# Patient Record
(demographics unavailable — no encounter records)

---

## 2017-04-26 NOTE — ER DOCUMENT REPORT
ED Respiratory Problem





- General


Chief Complaint: Cold Symptoms


Stated Complaint: CONGESTION


Information source: Patient


Notes: 


59-year-old male who presents today with 5-6 days of nasal congestion and a 

nonproductive cough without fevers, vomiting, chest pain, back pain, leg 

swelling, or diarrhea.  Patient states his coworker had similar symptoms last 

week.  Patient denies any difficulty breathing or swallowing.


TRAVEL OUTSIDE OF THE [place] IN LAST 30 DAYS: No





- HPI


Patient complains to provider of: Cough


Onset: Other - See above


Duration: Continuous


Quality of pain: No pain


Severity: Mild


Pain Level: Denies


Short of Breath: Mild


Cough: Nonproductive


Sputum amount: Scant


Sputum color: Clear


Associated symptoms: Other - See above


Similar symptoms previously: No


Recently seen / treated by doctor: No





- Related Data


Allergies/Adverse Reactions: 


 





No Known Allergies Allergy (Verified 03/20/16 10:41)


 











Past Medical History





- General


Information source: Patient





- Social History


Smoking Status: Unknown if Ever Smoked


Cigarette use (# per day): No


Chew tobacco use (# tins/day): No


Smoking Education Provided: No


Frequency of alcohol use: None


Drug Abuse: None


Family History: CAD





- Past Medical History


Cardiac Medical History: Reports: Hx Hypertension


   Denies: Hx Heart Attack


Pulmonary Medical History: 


   Denies: Hx Asthma


Neurological Medical History: Denies: Hx Cerebrovascular Accident, Hx Seizures


Renal/ Medical History: Denies: Hx Peritoneal Dialysis


GI Medical History: Denies: Hx Hepatitis, Hx Hiatal Hernia, Hx Ulcer


Infectious Medical History: Denies: Hx Hepatitis


Past Surgical History: Denies: Hx Open Heart Surgery, Hx Pacemaker





- Immunizations


Immunizations up to date: Yes


Hx Diphtheria, Pertussis, Tetanus Vaccination: Yes





Review of Systems





- Review of Systems


Constitutional: denies: Fever


EENT: Nose congestion, Nose discharge.  denies: Eye discharge


Cardiovascular: denies: Chest pain, Palpitations


Respiratory: denies: Short of breath


Gastrointestinal: denies: Vomiting


Genitourinary: denies: Dysuria


Musculoskeletal: denies: Leg swelling


Skin: Other - no hives.  denies: Rash


Neurological/Psychological: Other - no slurred speech


-: Yes All other systems reviewed and negative





Physical Exam





- Vital signs


Vitals: 





 











Temp Pulse Resp BP Pulse Ox


 


 98.4 F   92   18   141/82 H  98 


 


 04/26/17 18:19  04/26/17 18:19  04/26/17 18:19  04/26/17 18:19  04/26/17 18:19











Notes: 


Reviewed vital signs and nursing note as charted by RN.





CONSTITUTIONAL: Alert and oriented and responds appropriately to questions. Well

-appearing; well-nourished





HEAD: Normocephalic; atraumatic





EYES: PERRL; Conjunctivae clear, sclerae non-icteric





ENT: Normal nose; no rhinorrhea; moist mucous membranes; pharynx without 

lesions noted





NECK: no cervical lymphadenopathy, no masses





CARD: Regular rate and rhythm; no murmurs, no clicks, no rubs, no gallops; 

symmetric distal pulses





RESP: Normal chest excursion without splinting or tachypnea; breath sounds 

clear and equal bilaterally; no wheezing or rhonchi present.





BACK: The back appears normal and is non-tender to palpation





EXT: Normal ROM in all joints; non-tender to palpation; no cyanosis, no 

effusions, no edema





SKIN: Normal color for age and race; warm; dry; good turgor; capillary refill < 

2 seconds; no acute lesions noted





NEURO: Moves all extremities equally; Motor and sensory function intact





PSYCH: The patient's mood and manner are appropriate. Grooming and personal 

hygiene are appropriate.





Course





- Re-evaluation


Re-evalutation: 





04/26/17 19:14


Given the history and physical examination, with good oxygen saturation, clear 

lungs bilaterally, bilateral nasal rhinorrhea, I do not believe that the 

patient requires an x-ray at this time.  I believe the pretest probability for 

pneumonia to be extremely low.  Patient will be discharged home with strict 

return precautions and cough syrup.





- Vital Signs


Vital signs: 





 











Temp Pulse Resp BP Pulse Ox


 


 98.4 F   92   18   141/82 H  98 


 


 04/26/17 18:19  04/26/17 18:19  04/26/17 18:19  04/26/17 18:19  04/26/17 18:19














Discharge





- Discharge


Clinical Impression: 


 Nasal congestion, Cough





Condition: Good


Disposition: HOME, SELF-CARE


Additional Instructions: 


Come back immediately for any worsening cough, leg swelling, fever, difficulty 

breathing, chest pain, or any other acute problems.


Prescriptions: 


Guaifenesin/Codeine Phosphate [Codeine-Guaifen  mg/5 ml] 10 ml PO QID PRN 

#1 liquid


 PRN Reason:

## 2018-04-29 NOTE — RADIOLOGY REPORT (SQ)
EXAM DESCRIPTION:  L SPINE WHOLE



COMPLETED DATE/TIME:  4/29/2018 5:48 pm



REASON FOR STUDY:  low back pain



COMPARISON:  None.



NUMBER OF VIEWS:  Five views including obliques.



TECHNIQUE:  AP, lateral, oblique, and sacral radiographic images acquired of the lumbar spine.



LIMITATIONS:  None.



FINDINGS:  MINERALIZATION: Normal.

SEGMENTATION: Normal.  No transitional anatomy.

ALIGNMENT: Normal.

VERTEBRAE: Maintained height.  No fracture or worrisome bone lesion.

DISCS: Multilevel disc space narrowing with osteophytes.

POSTERIOR ELEMENTS: Pedicles and facets are intact.  No pars defect or posterior arch defects. Facet 
arthropathy is present.

HARDWARE: None in the spine.

PARASPINAL SOFT TISSUES: Normal.

PELVIS: Intact as visualized. No fractures or worrisome bone lesions. SI joints intact.

OTHER: No other significant finding.



IMPRESSION:  SPONDYLOSIS WITHOUT BONE LESION OR FRACTURE.



TECHNICAL DOCUMENTATION:  JOB ID:  [id number]

[id number]

 2011 [employer]- All Rights Reserved



Reading location - IP/workstation name: [email or web address]

## 2018-04-29 NOTE — ER DOCUMENT REPORT
HPI





- HPI


Patient complains to provider of: Low back pain


Onset: This afternoon


Onset/Duration: Gradual


Quality of pain: Achy


Pain Level: 4


Context: 





Patient complains of low back pain today that started while he was at [employer].  

Patient denies any injury.  Patient denies any urinary retention or 

incontinence.  Patient denies any fever.  Patient states he has had back pain 

in the past in this location although not typically severe.


Associated Symptoms: Other - Low back pain.  denies: Fever, Headache


Exacerbated by: Movement, Walking


Relieved by: Denies


Similar symptoms previously: Yes


Recently seen / treated by doctor: No





- ROS


ROS below otherwise negative: Yes


Systems Reviewed and Negative: Yes All other systems reviewed and negative





- CONSTITUTIONAL


Constitutional: DENIES: Fever, Chills





- NEURO


Neurology: DENIES: Weakness





- CARDIOVASCULAR


Cardiovascular: DENIES: Chest pain





- RESPIRATORY


Respiratory: DENIES: Trouble Breathing, Coughing





- URINARY


Notes: 





No retention or incontinence





- REPRODUCTIVE


Reproductive: DENIES: Pregnant:





- MUSCULOSKELETAL


Musculoskeletal: REPORTS: Back Pain





- DERM


Skin Color: Normal


Skin Problems: None





Past Medical History





- General


Information source: Patient





- Social History


Smoking Status: Never Smoker


Frequency of alcohol use: None


Drug Abuse: None


Occupation: Painting


Lives with: Spouse/Significant other


Family History: CAD


Patient has suicidal ideation: No


Patient has homicidal ideation: No





- Past Medical History


Cardiac Medical History: Reports: Hx Hypertension


   Denies: Hx Heart Attack


Pulmonary Medical History: 


   Denies: Hx Asthma


Neurological Medical History: Denies: Hx Cerebrovascular Accident, Hx Seizures


Renal/ Medical History: Denies: Hx Peritoneal Dialysis


GI Medical History: Denies: Hx Hepatitis, Hx Hiatal Hernia, Hx Ulcer


Infectious Medical History: Denies: Hx Hepatitis


Surgical Hx: Negative





- Immunizations


Immunizations up to date: Yes


Hx Diphtheria, Pertussis, Tetanus Vaccination: Yes





Vertical Provider Document





- CONSTITUTIONAL


Agree With Documented VS: Yes


Exam Limitations: No Limitations


General Appearance: WD/WN, No Apparent Distress


Notes: 





PHYSICAL EXAMINATION:





GENERAL: Well-appearing, well-nourished and in no acute distress.





HEAD: Atraumatic, normocephalic.





EYES: sclera clear, anicteric, conjunctiva are normal.





ENT: nares patent, Moist mucous membranes.





NECK: Normal range of motion, supple no lymphadenopathy





LUNGS: respirations unlabored





HEART: Regular rate and rhythm without murmurs 





EXTREMITIES: Normal range of motion, no pitting or edema.  No cyanosis. Gait 

normal, pt ambulates without difficulty





BACK: Left lower lumbar paraspinal tenderness, no midline tenderness, no 

deformities or step-offs.  No CVA tenderness. 





NEUROLOGICAL: Cranial nerves grossly intact.  Normal speech, normal gait.  No 

saddle anesthesia. 





PSYCH: Normal mood, normal affect.





SKIN: Warm, Dry, normal turgor, no rashes or lesions noted.











- INFECTION CONTROL


TRAVEL OUTSIDE OF THE [place] IN LAST 30 DAYS: No





Course





- Re-evaluation


Re-evalutation: 





04/29/18 19:06


Patient reports pain is improved after medication.  Reviewed patient's x-ray 

report.  The patient presents with low back pain without signs of spinal cord 

compression, cauda equina syndrome, infection, aneurysm, or other serious 

etiology.  The patient is neurologically intact.  Given the extremely risk of 

these diagnoses further testing and evaluation for these possibilities does not 

appear to be indicated at this time.  Patient has been instructed to return if 

the symptoms worsen or change in any way.


04/29/18 19:07


Discussed emergent flag symptoms that patient should return immediately for.  

Patient and wife verbalized understanding and agree with plan of care.





- Vital Signs


Vital signs: 


 











Temp Pulse Resp BP Pulse Ox


 


 98.7 F   86   14   113/65   97 


 


 04/29/18 15:59  04/29/18 15:59  04/29/18 15:59  04/29/18 15:59  04/29/18 15:59














- Diagnostic Test


Radiology reviewed: Reports reviewed





Discharge





- Discharge


Clinical Impression: 


Low back pain


Qualifiers:


 Chronicity: acute Back pain laterality: left Sciatica presence: with sciatica 

Sciatica laterality: sciatica of left side Qualified Code(s): M54.42 - Lumbago 

with sciatica, left side





Condition: Stable


Disposition: HOME, SELF-CARE


Instructions:  Ice Packs ([facility]), Low Back Pain ([facility]), Oral Narcotic Medication (

[facility]), Sciatica ([facility])


Additional Instructions: 


Return immediately for any new or worsening symptoms





Followup with your primary care provider, call tomorrow to make a followup 

appointment








Prescriptions: 


Ondansetron HCl [Zofran 4 mg Tablet] 1 - 2 tab PO Q6 PRN #15 tablet


 PRN Reason: 


Oxycodone HCl/Acetaminophen [Percocet 5-325 mg Tablet] 1 tab PO ASDIR PRN #15 

tablet


 PRN Reason: 


Forms:  Return to Work


Referrals: 


[facility] [Provider Group] - Follow up as needed


[facility] [Provider Group] - Follow up tomorrow

## 2019-02-25 NOTE — ER DOCUMENT REPORT
ED ENT





- General


Chief Complaint: Painful Cough


Stated Complaint: FLU LIKE SYMPTOMS


Time Seen by Provider: 02/25/19 18:34


Mode of Arrival: Ambulatory


Information source: Patient


Notes: 





60-year-old male presented to ED for complaint of cough cold congestion with dry

cough times 2 weeks.


TRAVEL OUTSIDE OF THE [place] IN LAST 30 DAYS: No





- HPI


Patient complains to provider of: Dental problem, Ear problem, Nose problem, 

Throat problem


Onset: Other - Dental pain on Saturday fever cough cold congestion times 2 weeks


Onset/Duration: Gradual


Quality of pain: Achy, Stabbing


Severity: Moderate


Pain Level: 2


Context: Recent Illness, Other - Dental pain


Location of pain: Nose, Sinus, Tooth


Associated symptoms: Congestion, Cough, Dental pain, Dental caries, Runny nose, 

Sinus pain, Sinus drainage, Sore throat


Similar symptoms previously: Yes


Recently seen / treated by doctor: No





- Related Data


Allergies/Adverse Reactions: 


                                        





No Known Allergies Allergy (Verified 03/20/16 10:41)


   











Past Medical History





- General


Information source: Patient





- Social History


Smoking Status: Current Every Day Smoker


Cigarette use (# per day): Yes - 6 cigarettes a day


Smoking Education Provided: Yes - Minutes


Frequency of alcohol use: None


Drug Abuse: None


Occupation: 


Lives with: Family


Family History: CAD


Patient has suicidal ideation: No


Patient has homicidal ideation: No





- Past Medical History


Cardiac Medical History: Reports: Hx Hypertension


Pulmonary Medical History: Reports: None


EENT Medical History: Reports: None


Neurological Medical History: Reports: None


Endocrine Medical History: Reports: None


Renal/ Medical History: Reports: None


Malignancy Medical History: Reports None


GI Medical History: Reports: None


Musculoskeletal Medical History: Reports None


Skin Medical History: Reports None


Psychiatric Medical History: Reports: None


Traumatic Medical History: Reports: None


Infectious Medical History: Reports: None


Surgical Hx: Negative


Past Surgical History: Reports: None





- Immunizations


Immunizations up to date: Yes


Hx Diphtheria, Pertussis, Tetanus Vaccination: Yes





Review of Systems





- Review of Systems


Constitutional: No symptoms reported


EENT: Nose discharge, Sinus pressure, Sinus discharge, Mouth pain, Dental 

problem


Cardiovascular: No symptoms reported


Respiratory: Cough


Gastrointestinal: No symptoms reported


Genitourinary: No symptoms reported


Male Genitourinary: No symptoms reported


Musculoskeletal: No symptoms reported


Skin: No symptoms reported


Hematologic/Lymphatic: No symptoms reported


Neurological/Psychological: No symptoms reported


-: Yes All other systems reviewed and negative





Physical Exam





- Vital signs


Vitals: 


                                        











Temp Pulse Resp BP Pulse Ox


 


 99.7 F   99   16   144/85 H  96 


 


 02/25/19 17:14  02/25/19 17:14  02/25/19 17:14  02/25/19 17:14  02/25/19 17:14











Interpretation: Normal





- General


General appearance: Appears well, Alert





- HEENT


Head: Normocephalic, Atraumatic


Eyes: Normal


Pupils: PERRL


Ears: Normal


External canal: Normal


Tympanic membrane: Normal


Sinus: Normal


Nasal: Purulent discharge, Swelling


Mouth/Lips: Caries


Mucous membranes: Normal


Pharynx: Post nasal drainage


Neck: Normal





- Respiratory


Respiratory status: No respiratory distress


Chest status: Nontender


Breath sounds: Nonproductive cough


Chest palpation: Normal





- Cardiovascular


Rhythm: Regular


Heart sounds: Normal auscultation


Murmur: No





- Abdominal


Inspection: Normal


Distension: No distension


Bowel sounds: Normal


Tenderness: Nontender


Organomegaly: No organomegaly





- Back


Back: Normal, Nontender





- Extremities


General upper extremity: Normal inspection, Nontender, Normal color, Normal ROM,

Normal temperature


General lower extremity: Normal inspection, Nontender, Normal color, Normal ROM,

Normal temperature, Normal weight bearing.  No: Jose Alberto's sign





- Neurological


Neuro grossly intact: Yes


Cognition: Normal


Orientation: AAOx4


[place] Coma Scale Eye Opening: Spontaneous


[place] Coma Scale Verbal: Oriented


[place] Coma Scale Motor: Obeys Commands


[place] Coma Scale Total: 15


Speech: Normal


Motor strength normal: LUE, RUE, LLE, RLE


Sensory: Normal





- Psychological


Associated symptoms: Normal affect, Normal mood





- Skin


Skin Temperature: Warm


Skin Moisture: Dry


Skin Color: Normal





Course





- Vital Signs


Vital signs: 


                                        











Temp Pulse Resp BP Pulse Ox


 


 99.1 F   93   16   147/82 H  95 


 


 02/25/19 19:38  02/25/19 19:38  02/25/19 19:38  02/25/19 19:38  02/25/19 19:38














Discharge





- Discharge


Clinical Impression: 


 Pain due to dental caries





URI (upper respiratory infection)


Qualifiers:


 URI type: unspecified viral URI Qualified Code(s): J06.9 - Acute upper 

respiratory infection, unspecified





Condition: Stable


Disposition: HOME, SELF-CARE


Instructions:  Family Physicians / Practices


Additional Instructions: 


TOOTHACHE:





     Your pain is due to dental decay.  The tooth must be repaired in order for 

you to feel better.  You will, therefore, be referred to a dentist.  We do not 

have dentists on the staff at [facility].


     Severe swelling or drainage around a tooth usually means a dental abscess. 

This also requires evaluation and treatment by the dentist, but antibiotics may 

be prescribed while awaiting dental treatment.


     You should be rechecked immediately if you develop major swelling of the 

face, increasing pain, a lump in the jaw or gums, headache, difficulty s

wallowing, or fever.





UPPER RESPIRATORY ILLNESS:





     You have a viral infection of the respiratory passages -- a "cold."  This 

common infection causes nasal congestion, drainage, and often sore throat and 

cough.  It is highly contagious.  The disease usually lasts about 10 to 14 days.


     There is no "cure" for the viral infection -- it must run its course.  If 

there is a complication, such as bacterial infection in the nose, sinuses, 

middle ear, or bronchial tubes, antibiotics may be required.  The antibiotics 

won't affect the virus.


     Drink plenty of fluids.  A humidifier may help.  An expectorant medication 

or decongestant may make you more comfortable.  Use acetaminophen or ibuprofen 

for fever or aches.


     See the doctor if fever persists over two days, if there is any significant

worsening of your symptoms, or if you simply fail to improve as expected.





ORAL NARCOTIC MEDICATION:


     You have been given a prescription for pain control.  This medication is a 

narcotic.  It's best taken with food, as nausea can result if taken on an empty 

stomach.


     Don't operate machinery or drive within six hours of taking this 

medication.  Do not combine this medicine with alcohol, or with any medication 

which can cause sedation (such as cold tablets or sleeping pills) unless you get

permission from the physician.


     Narcotics tend to cause constipation.  If possible, drink plenty of fluids 

and eat a diet high in fiber and fruits.





     Please be aware that prescription narcotics also have the potential for 

abuse.  People become addicted to these medications because of the general sense

of wellbeing that they induce.  This feeling along with a significant reduction 

in tension, anxiety, and aggression provides a stimulating seductive quality to 

these drugs.  Once your pain is under control, we encourage you to discard your 

unused narcotics.





Amoxicillin 





    Amoxicillin is a member of the penicillin family.  It covers the germs li

artem to cause ear, bronchial, and urinary infections better than plain 

penicillin.


     Amoxicillin can be taken without regard to meals.  Nausea after taking the 

medication is rare, but can occur.  Diarrhea can occur, particularly in small 

children.  Vaginal yeast infections and oral thrush in infants are also common. 

Contact your physician if these problems occur.


     Allergy to penicillins is common.  If you have had an allergic reaction to 

any drug of the penicillin family, you should never take any other penicillin.  

Notify your doctor at once if you develop hives, itching, swelling, faintness, 

or shortness of breath.  Less serious side effects can include nausea or 

diarrhea.





SMOKING:


     If you smoke, you should stop smoking.  The tar and chemicals in cigarette 

smoke are harmful.  Smoking has been shown to cause:


          emphysema


          chronic bronchitis


          lung cancer


          mouth and throat cancer


          stomach and pancreas cancer


          premature aging


          birth defects


     In addition, smoking increases ear and lung infections in children of 

smokers.








FOLLOW-UP CARE:


     You have been referred for follow-up care to the dentists listed below.  

Call the dentists office for an appointment as you were instructed or within 

the next two days.  If you experience worsening or a significant change in your 

symptoms, notify the physician immediately or return to the Emergency Department

at any time for re-evaluation.





[facility]


[address] [place], [place]


[phone]








[facility]


[address] [place] [address] [phone]





[facility]


[address]


[phone]





[facility]


[address]


[facility]


[phone]





[facility]


[address]


[phone]


[email or web address]





[facility]


[address] [place]  [address] [phone]


Monday-Thursdays  8:00am to 5:00 pm


Will see patients from other [facility].


Charges based on income and family size and


accepts Medicare, Medicaid, and Insurances


Will pull molars





[facility]


Student Clinics


[facility] [address] [phone]


Hours of Operation


   8:00 am - 4:30 pm weekdays





The following dental offices accept Medicaid:





   [facility]   [phone]


   Dr. Denson         [phone]


   Dr. Bhatia         [phone]


   Dr. Berrios         [phone]


   Dr. Watkins         [phone]


   [facility]


      oral surgery      [phone]


   Dr. Manriquez ([place])      [phone]


   Dr. Rodriguez ([place])   [phone]


   [facility]      [phone]


   [facility] ([place])   [phone]


   Dr. Wyatt ([place])      [phone]


   [facility]      [phone]


   [facility]   [phone]


   [facility]   [phone]


   Dr. Izaguirre ([place])      [phone]


   [facility] Peralta and 


      ([place])      [phone]





   Medicaid Care Line      [phone]


Prescriptions: 


Hydrocodone/Acetaminophen [Norco 5-325 mg Tablet] 1 tab PO Q8HP PRN #10 tablet


 PRN Reason: 


Amoxicillin Trihydrate [Amoxil 875 mg Tablet] 1 tab PO BID #20 tablet


Forms:  Elevated Blood Pressure, Smoking Cessation Education, Return to Work

## 2019-03-19 NOTE — ER DOCUMENT REPORT
ED Respiratory Problem





- General


Chief Complaint: Cough


Stated Complaint: COUGH


Time Seen by Provider: 03/19/19 18:59


Primary Care Provider: 


[facility] [Provider Group] - Follow up as needed


Mode of Arrival: Ambulatory


Information source: Patient


Notes: 





60-year-old male presented to ED for complaint of cough times a month.  He was 

diagnosed with an upper respiratory infection and the earache a month ago.  He 

states the cough is still not gone.  Patient is alert oriented respirations 

regular and unlabored speaking in full sentences walks with a even steady gait. 

Patient does still have a nonproductive cough.  He does continue to smoke 

cigarettes.


TRAVEL OUTSIDE OF THE [place] IN LAST 30 DAYS: No





- HPI


Patient complains to provider of: Cough


Onset: Other - A month


Duration: Intermittent episodes


Initiating Event: URI


Quality of pain: Achy


Severity: Moderate


Pain Level: 3


Context: Smoker


Cough: Nonproductive


Sputum amount: None


Associated symptoms: Congestion, Cough, PND, Runny nose, Sinus pain/pressure


Similar symptoms previously: Yes


Recently seen / treated by doctor: No





- Related Data


Allergies/Adverse Reactions: 


                                        





No Known Allergies Allergy (Verified 03/19/19 18:07)


   











Past Medical History





- General


Information source: Patient





- Social History


Smoking Status: Current Every Day Smoker


Cigarette use (# per day): Yes - 6 cigarettes a day


Smoking Education Provided: Yes - Minutes


Frequency of alcohol use: None


Drug Abuse: None


Occupation: 


Lives with: Family


Family History: CAD


Patient has suicidal ideation: No


Patient has homicidal ideation: No





- Past Medical History


Cardiac Medical History: Reports: Hx Hypertension


Pulmonary Medical History: Reports: Hx Bronchitis


EENT Medical History: Reports: None


Neurological Medical History: Reports: None


Endocrine Medical History: Reports: None


Renal/ Medical History: Reports: None


Malignancy Medical History: Reports None


GI Medical History: Reports: None


Musculoskeletal Medical History: Reports None


Skin Medical History: Reports None


Psychiatric Medical History: Reports: None


Traumatic Medical History: Reports: None


Infectious Medical History: Reports: None.  Denies: Hx Hepatitis


Surgical Hx: Negative


Past Surgical History: Reports: None





- Immunizations


Immunizations up to date: Yes


Hx Diphtheria, Pertussis, Tetanus Vaccination: Yes





Review of Systems





- Review of Systems


Constitutional: No symptoms reported


EENT: No symptoms reported


Cardiovascular: No symptoms reported


Respiratory: No symptoms reported


Gastrointestinal: No symptoms reported


Genitourinary: No symptoms reported


Male Genitourinary: No symptoms reported


Musculoskeletal: No symptoms reported


Skin: No symptoms reported


Hematologic/Lymphatic: No symptoms reported


Neurological/Psychological: No symptoms reported





Physical Exam





- Vital signs


Vitals: 


                                        











Temp Pulse Resp BP Pulse Ox


 


 98.3 F   88   18   146/80 H  96 


 


 03/19/19 18:22  03/19/19 18:22  03/19/19 18:22  03/19/19 18:22  03/19/19 18:22











Interpretation: Normal





- General


General appearance: Appears well, Alert





- HEENT


Head: Normocephalic, Atraumatic


Eyes: Normal


Pupils: PERRL


Ears: Normal


External canal: Normal


Tympanic membrane: Normal


Sinus: Normal


Nasal: Purulent discharge, Swelling


Mouth/Lips: Normal


Mucous membranes: Normal


Pharynx: Erythema, Post nasal drainage.  No: Exudate, Tonsillar hypertrophy


Neck: Normal





- Respiratory


Respiratory status: No respiratory distress


Chest status: Nontender


Breath sounds: Nonproductive cough


Chest palpation: Normal





- Cardiovascular


Rhythm: Regular


Heart sounds: Normal auscultation


Murmur: No





- Abdominal


Inspection: Normal


Distension: No distension


Bowel sounds: Normal


Tenderness: Nontender


Organomegaly: No organomegaly





- Back


Back: Normal, Nontender





- Extremities


General upper extremity: Normal inspection, Nontender, Normal color, Normal ROM,

Normal temperature


General lower extremity: Normal inspection, Nontender, Normal color, Normal ROM,

Normal temperature, Normal weight bearing.  No: Jose Alberto's sign





- Neurological


Neuro grossly intact: Yes


Cognition: Normal


Orientation: AAOx4


[place] Coma Scale Eye Opening: Spontaneous


[place] Coma Scale Verbal: Oriented


[place] Coma Scale Motor: Obeys Commands


[place] Coma Scale Total: 15


Speech: Normal


Motor strength normal: LUE, RUE, LLE, RLE


Sensory: Normal





- Psychological


Associated symptoms: Normal affect, Normal mood





- Skin


Skin Temperature: Warm


Skin Moisture: Dry


Skin Color: Normal





Course





- Re-evaluation


Re-evalutation: 





03/19/19 21:12


After performing a Medical Screening Examination, I estimate there is LOW risk 

for ACUTE CORONARY SYNDROME, RESPIRATORY FAILURE, SEPSIS OR MENINGITIS, thus I 

consider the discharge disposition reasonable.  I have reevaluated this patient 

multiple times and no significant life threatening changes are noted. The 

patient and I have discussed the diagnosis and risks, and we agree with 

discharging home with close follow-up. We also discussed returning to the 

Emergency Department immediately if new or worsening symptoms occur. We have 

discussed the symptoms which are most concerning (e.g., changing or worsening 

pain, trouble swallowing or breathing, neck stiffness, fever) that necessitate 

immediate return.





- Vital Signs


Vital signs: 


                                        











Temp Pulse Resp BP Pulse Ox


 


 98.3 F   88   18   146/80 H  96 


 


 03/19/19 18:22  03/19/19 18:22  03/19/19 18:22  03/19/19 18:22  03/19/19 18:22














Discharge





- Discharge


Clinical Impression: 


URI (upper respiratory infection)


Qualifiers:


 URI type: unspecified viral URI Qualified Code(s): J06.9 - Acute upper 

respiratory infection, unspecified





Condition: Stable


Disposition: HOME, SELF-CARE


Additional Instructions: 


UPPER RESPIRATORY ILLNESS:





     You have a viral infection of the respiratory passages -- a "cold."  This 

common infection causes nasal congestion, drainage, and often sore throat and 

cough.  It is highly contagious.  The disease usually lasts about 10 to 14 days.


     There is no "cure" for the viral infection -- it must run its course.  If 

there is a complication, such as bacterial infection in the nose, sinuses, 

middle ear, or bronchial tubes, antibiotics may be required.  The antibiotics 

won't affect the virus.


     Drink plenty of fluids.  A humidifier may help.  An expectorant medication 

or decongestant may make you more comfortable.  Use acetaminophen or ibuprofen 

for fever or aches.


     See the doctor if fever persists over two days, if there is any significant

worsening of your symptoms, or if you simply fail to improve as expected.








COUGH-SUPPRESSANT & EXPECTORANT MEDICATION:


     You are to use a cough medication as needed for relief of symptoms.  This 

medicine is a combination of an expectorant (to make the mucous thinner and more

easily "coughed up") and a cough suppressant (to reduce the frequency of 

coughing).


     The cough-suppressant medicine is related to narcotics.  You may experience

mild nausea and sleepiness.  Some patients who are very sensitive to narcotics 

may have stomach pain from this medicine. Taking the medicine with food reduces 

these side effects.  Do not drive or work with machinery until you know how this

medicine affects you.


     The expectorant should have no side effects.  Iodine-containing 

expectorants (such as organidin) should not be taken by persons with active 

thyroid disease unless approved by your doctor.


     Call the doctor if you develop shortness of breath, hives, rash, itching, 

lightheadedness, or severe nausea and vomiting.





USE OF ACETAMINOPHEN (Tylenol):


     Acetaminophen may be taken for pain relief or fever control. It's much 

safer than aspirin, offering a wider range of "safe" dosages.  It is safe during

pregnancy.  Some brand names are Tylenol, Panadol, Datril, Anacin 3, Tempra, and

Liquiprin. Acetaminophen can be repeated every four hours.  The following are 

maximum recommended dosages:


>89 pounds or adults          650 mg to 900 mg


Acetaminophen can be repeated every four hours.  Maximum dose not to exceed 4000

mg a day.








SMOKING:


     If you smoke, you should stop smoking.  The tar and chemicals in cigarette 

smoke are harmful.  Smoking has been shown to cause:


          emphysema


          chronic bronchitis


          lung cancer


          mouth and throat cancer


          stomach and pancreas cancer


          premature aging


          birth defects


     In addition, smoking increases ear and lung infections in children of 

smokers.





Salt and soda solution





1 quart of water


1 tablespoon of salt


1 teaspoon of baking soda


Mixed 3 ingredients together and boil for 1 minute


Placed in a covered quart jar


Use 1/2 ounce of cold solution to gargle 3 times a day





FOLLOW-UP CARE:


If you have been referred to a physician for follow-up care, call the 

physicians office for an appointment as you were instructed or within the next 

two days.  If you experience worsening or a significant change in your symptoms,

notify the physician immediately or return to the Emergency Department at any 

time for re-evaluation.





Prescriptions: 


Benzonatate [Tessalon Perle 100 mg Capsule] 100 mg PO Q8HP PRN #30 cap


 PRN Reason: 


Forms:  Elevated Blood Pressure, Smoking Cessation Education, Return to Work


Referrals: 


[facility] [Provider Group] - Follow up as needed

## 2019-09-06 NOTE — ER DOCUMENT REPORT
ED Skin Rash/Insect Bite/Abscs





- General


Chief Complaint: Allergy Symptoms


Stated Complaint: POSSIBLE ALLERGIC REACTION


Time Seen by Provider: 09/06/19 12:27


Primary Care Provider: 


[facility] [Primary Care Provider] - Follow up as needed


Mode of Arrival: Ambulatory


Information source: Patient


Notes: 





61-year-old male presents to ED for complaint of insect bites to his head and 

scalp on Monday at a barbecue.  He states he still feels like he is having some 

swelling to his head and scalp.  No obvious swelling noted at this time.  He 

states he has been taking Benadryl and took the last at 3:00.  He was worried 

that he was having allergic reaction because he still felt like there was 

swelling.  He has no tightness to his throat no difficulty breathing no 

shortness of breath no signs or symptoms of any anaphylactic reaction.  There is

no swelling to his lips no swelling to his tongue.  Patient is alert oriented 

respirations regular and unlabored speaking in full sentences.


TRAVEL OUTSIDE OF THE [place] IN LAST 30 DAYS: No





- HPI


Patient complains to provider of: Insect bite


Onset: Other - Monday


Onset/Duration: Intermittent


Quality of pain: Achy


Severity: Mild


Pain Level: 1


Skin Character: Other - Multiple insect bites to his scalp and one to his face


Quality of rash: Itchy


Exacerbated by: Denies


Relieved by: Denies


Similar symptoms previously: Yes


Recently seen / treated by doctor: No





- Related Data


Allergies/Adverse Reactions: 


                                        





wasp Allergy (Uncoded 09/06/19 11:48)


   











Past Medical History





- General


Information source: Patient





- Social History


Smoking Status: Current Every Day Smoker


Cigarette use (# per day): Yes - 15 cigarettes a day


Smoking Education Provided: Yes - 4 minutes


Frequency of alcohol use: None


Drug Abuse: None


Family History: CAD


Patient has suicidal ideation: No


Patient has homicidal ideation: No





- Past Medical History


Cardiac Medical History: Reports: Hx Hypertension


Pulmonary Medical History: Reports: Hx Bronchitis


EENT Medical History: Reports: None


Neurological Medical History: Reports: None


Endocrine Medical History: Reports: None


Renal/ Medical History: Reports: None


Malignancy Medical History: Reports None


GI Medical History: Reports: None


Musculoskeletal Medical History: Reports None


Skin Medical History: Reports None


Psychiatric Medical History: Reports: None


Traumatic Medical History: Reports: None


Infectious Medical History: Reports: None


Surgical Hx: Negative


Past Surgical History: Reports: None





- Immunizations


Immunizations up to date: Yes


Hx Diphtheria, Pertussis, Tetanus Vaccination: Yes





Review of Systems





- Review of Systems


Constitutional: No symptoms reported


EENT: Other - Several insect bites to the scalp and face


Cardiovascular: No symptoms reported


Respiratory: No symptoms reported


Gastrointestinal: No symptoms reported


Genitourinary: No symptoms reported


Male Genitourinary: No symptoms reported


Musculoskeletal: No symptoms reported


Skin: No symptoms reported


Hematologic/Lymphatic: No symptoms reported


Neurological/Psychological: No symptoms reported


-: Yes All other systems reviewed and negative





Physical Exam





- Vital signs


Vitals: 


                                        











Temp Pulse Resp BP Pulse Ox


 


 98.2 F   86   16   143/81 H  97 


 


 09/06/19 11:54  09/06/19 11:54  09/06/19 11:54  09/06/19 11:54  09/06/19 11:54











Interpretation: Normal





- General


General appearance: Appears well, Alert





- HEENT


Head: Normocephalic, Atraumatic


Eyes: Normal


Pupils: PERRL





- Respiratory


Respiratory status: No respiratory distress


Chest status: Nontender


Breath sounds: Normal


Chest palpation: Normal





- Cardiovascular


Rhythm: Regular


Heart sounds: Normal auscultation


Murmur: No





- Abdominal


Inspection: Normal


Distension: No distension


Bowel sounds: Normal


Tenderness: Nontender


Organomegaly: No organomegaly





- Back


Back: Normal, Nontender





- Extremities


General upper extremity: Normal inspection, Nontender, Normal color, Normal ROM,

Normal temperature


General lower extremity: Normal inspection, Nontender, Normal color, Normal ROM,

Normal temperature, Normal weight bearing.  No: Jose Alberto's sign





- Neurological


Neuro grossly intact: Yes


Cognition: Normal


Orientation: AAOx4


[place] Coma Scale Eye Opening: Spontaneous


[place] Coma Scale Verbal: Oriented


[place] Coma Scale Motor: Obeys Commands


[place] Coma Scale Total: 15


Speech: Normal


Motor strength normal: LUE, RUE, LLE, RLE


Sensory: Normal





- Psychological


Associated symptoms: Normal affect, Normal mood





- Skin


Skin Temperature: Warm


Skin Moisture: Dry


Skin Color: Normal


Location of irregularity: Face, Scalp, Other - Several small insect bites to 

face and scalp no swelling no redness no inflammation


Irregularity with: negative: Swelling, Tenderness





Course





- Re-evaluation


Re-evalutation: 





09/06/19 12:39


Patient given instructions on Benadryl and Pepcid for his insect bites to reduce

the itching.  He stated he thought he was having allergic reaction because they 

were still itching.  There is no swelling noted no redness noted he said that 

times a hurt a little bit.  Patient was given Pepcid in the emergency room and 

instructed to get some more Benadryl to use at home if they continue to each.  

He was also instructed on using ice packs.  Patient verbalized understanding and

agreement with treatment plan and  patient was discharged home.





- Vital Signs


Vital signs: 


                                        











Temp Pulse Resp BP Pulse Ox


 


 98.2 F   86   16   143/81 H  97 


 


 09/06/19 11:54  09/06/19 11:54  09/06/19 11:54  09/06/19 11:54  09/06/19 11:54














Discharge





- Discharge


Clinical Impression: 


Insect bite


Qualifiers:


 Encounter type: initial encounter Site of insect bite: head Site of insect bite

of head: scalp Qualified Code(s): S00.06XA - Insect bite (nonvenomous) of scalp,

initial encounter





Condition: Stable


Disposition: HOME, SELF-CARE


Additional Instructions: 


Insect Bites





     You have been bitten by an insect.  These bites can cause two types of 

swelling:  an initial swelling due to insect saliva or injected poison, and a 

late reaction due to your body's allergic reaction.


     This initial local reaction may be uncomfortable but is not dangerous.  

Often there's an itchy "hive" at the bite location.  This is treated with 

antihistamines, cold compresses, and resting the affected body part.


     The later reaction often develops about the second day.  The entire area 

becomes very swollen, red, itchy, and tender.  This is an allergic reaction.  

Your body is attacking the leftover insect saliva or venom.  This type of 

allergy is unpleasant, but not dangerous.  We treat this swelling with 

cortisone-type medicine.  Sometimes we use antibiotics if we're worried about 

infection.  Antihistamines help with the itch.


     If you develop a fever, chills, a red streak, or swollen glands in the area

of the bite, infection may be starting.  Return at once.





ACID-SUPPRESSING MEDICATION:


     You have a prescription for medicine which reduces the stomach's secretion 

of acid.  Examples include Zantac, Tagament, and Pepcid.  These drugs are often 

used to allow healing of ulcers or esophagitis.  They may be needed to prevent 

recurrence of ulcers in some patients, or to prevent damage from acid reflux in 

the esophagus.


     Take all medication as prescribed, even after the pain is gone. Regular 

antacids may be added as needed if you have symptoms while taking this medicine.


     These medications sometimes are prescribed for allergic reactions because 

they have anti-histaminic effects and relieve the rash and itching of the 

reaction.


     There are usually no side effects from this medication.  But, in rare cases

and particularly in the elderly, serious problems can occur. Contact your doctor

if there is fever, rash, hallucinations, confusion, or unusual bruising.


     Contact your doctor at once if you develop lightheadedness, black or bloody

stool, or bloody vomitus.








ANTIHISTAMINES:


     An antihistamine has been given and/or prescribed to control your symptoms.

Antihistamines are used for many reasons, including itching, watering eyes, 

runny nose, allergic swelling, hives, and insect stings.


     Antihistamines may cause drowsiness, especially with the first dose.  Do 

not operate machinery or drive while under the effects of the medication.  Other

common side effects include dry mouth and eyes.  In older persons, 

antihistamines can occasionally cause urinary retention, constipation, and 

trouble focusing the eyes.


     Do not combine the medication with alcohol, or with any other medication 

without talking to your doctor.








USE OF DIPHENHYDRAMINE:


     The use of diphenhydramine (Benadryl) has been recommended to control 

allergic symptoms.  The 25 mg strength is available over- the-counter, as well 

as the elixir.  This antihistamine is used for many symptoms.  It's useful for 

itching, watering eyes and nose, allergic swelling, hives, and insect stings.  

The medication can be repeated four times daily.


     Age         Elixir (12.5 mg/tsp)         25 mg pill


     2-3 yr      1/2 tsp


     4-8 yr      1 tsp


     9-14 yr     2 tsp                        one tab


     adult                                    1-2 tabs


     Antihistamines may cause drowsiness, especially with the first dose.  Do 

not operate machinery or drive while under the effects of the medication.  Do 

not combine the medication with alcohol, or with any other medication without 

talking to your doctor.








FOLLOW-UP CARE:


If you have been referred to a physician for follow-up care, call the 

physicians office for an appointment as you were instructed or within the next 

two days.  If you experience worsening or a significant change in your symptoms,

notify the physician immediately or return to the Emergency Department at any 

time for re-evaluation.





Forms:  Elevated Blood Pressure, Return to Work


Referrals: 


[facility] [Primary Care Provider] - Follow up as needed

## 2019-09-30 NOTE — ER DOCUMENT REPORT
ED Medical Screen (RME)





- General


Chief Complaint: Cough


Stated Complaint: COUGH


Time Seen by Provider: 09/30/19 17:43


Primary Care Provider: 


[facility] [Primary Care Provider] - Follow up as needed


TRAVEL OUTSIDE OF THE [place] IN LAST 30 DAYS: No





- HPI


Notes: 





09/30/19 17:44


Patient is a 61-year-old male no significant past medical history aside from 

tobacco abuse who presents complaining of semi-productive cough that is been 

relatively constant for the past 2 weeks.  He does have associated nasal 

congestion and discharge as well.  He has reported intermittent fevers.  He is 

otherwise able to eat and drink without difficulty.  Denies chest pain.





I have treated and performed a rapid initial assessment of this patient.  A 

comprehensive ED assessment and evaluation of the patient, analysis of test 

results and completion of medical decision making process will be conducted by 

additional ED providers.





PHYSICAL EXAMINATION:





GENERAL: Well-appearing, well-nourished and in no acute distress.  A&Ox4.  

Answers questions appropriately.


Lungs:  CTAB


Heart: RRR





- Related Data


Allergies/Adverse Reactions: 


                                        





No Known Drug Allergies Allergy (Verified 09/30/19 17:31)


   


wasp Allergy (Uncoded 09/30/19 17:31)


   











Past Medical History





- Social History


Chew tobacco use (# tins/day): No


Frequency of alcohol use: None


Drug Abuse: None





- Past Medical History


Cardiac Medical History: Reports: Hx Hypertension


   Denies: Hx Heart Attack


Pulmonary Medical History: Reports: Hx Bronchitis


   Denies: Hx Asthma


Neurological Medical History: Denies: Hx Cerebrovascular Accident, Hx Seizures


Renal/ Medical History: Denies: Hx Peritoneal Dialysis


GI Medical History: Denies: Hx Hepatitis, Hx Hiatal Hernia, Hx Ulcer


Infectious Medical History: Denies: Hx Hepatitis


Past Surgical History: Denies: Hx Open Heart Surgery, Hx Pacemaker





- Immunizations


Immunizations up to date: Yes


Hx Diphtheria, Pertussis, Tetanus Vaccination: Yes





Physical Exam





- Vital signs


Vitals: 





                                        











Temp Pulse Resp BP Pulse Ox


 


 98.9 F   88   16   126/80 H  98 


 


 09/30/19 17:23  09/30/19 17:23  09/30/19 17:23  09/30/19 17:23  09/30/19 17:23














Course





- Vital Signs


Vital signs: 





                                        











Temp Pulse Resp BP Pulse Ox


 


 98.9 F   88   16   126/80 H  98 


 


 09/30/19 17:23  09/30/19 17:23  09/30/19 17:23  09/30/19 17:23  09/30/19 17:23














Doctor's Discharge





- Discharge


Referrals: 


[facility] [Primary Care Provider] - Follow up as needed

## 2019-09-30 NOTE — ER DOCUMENT REPORT
HPI





- HPI


Patient complains to provider of: cough


Time Seen by Provider: 09/30/19 17:43


Onset: Other - 2 weeks


Onset/Duration: Persistent


Pain Level: 2


Context: 





Patient presents with occasionally productive cough for the past 2 weeks and 

nasal congestion.  Patient reports subjective fever off and on.  Patient denies 

any significant medical history aside from high blood pressure.  Patient does 

report a history of smoking.


Associated Symptoms: Productive cough, Fever - Off and on, Rhinnorhea.  denies: 

Chest pain


Exacerbated by: Denies


Relieved by: Denies


Similar symptoms previously: Yes


Recently seen / treated by doctor: No





- ROS


ROS below otherwise negative: Yes


Systems Reviewed and Negative: Yes All other systems reviewed and negative





- CONSTITUTIONAL


Constitutional: DENIES: Fever, Chills





- NEURO


Neurology: DENIES: Headache





- CARDIOVASCULAR


Cardiovascular: DENIES: Chest pain





- RESPIRATORY


Respiratory: REPORTS: Coughing.  DENIES: Trouble Breathing





- GASTROINTESTINAL


Gastrointestinal: DENIES: Nausea





- MUSCULOSKELETAL


Musculoskeletal: DENIES: Extremity pain





- DERM


Skin Color: Normal


Skin Problems: None





Past Medical History





- General


Information source: Patient





- Social History


Smoking Status: Current Every Day Smoker


Chew tobacco use (# tins/day): No


Frequency of alcohol use: None


Drug Abuse: None


Occupation: Painting


Family History: CAD


Patient has suicidal ideation: No


Patient has homicidal ideation: No





- Past Medical History


Cardiac Medical History: Reports: Hx Hypertension


Pulmonary Medical History: Reports: Hx Bronchitis


Neurological Medical History: Denies: Hx Cerebrovascular Accident, Hx Seizures


Renal/ Medical History: Denies: Hx Peritoneal Dialysis


Infectious Medical History: Denies: Hx Hepatitis


Surgical Hx: Negative





- Immunizations


Immunizations up to date: Yes


Hx Diphtheria, Pertussis, Tetanus Vaccination: Yes





Vertical Provider Document





- CONSTITUTIONAL


Agree With Documented VS: Yes


Exam Limitations: No Limitations


General Appearance: WD/WN, No Apparent Distress





- INFECTION CONTROL


TRAVEL OUTSIDE OF THE [place] IN LAST 30 DAYS: No





- HEENT


HEENT: Atraumatic, Normocephalic.  negative: Pharyngeal Exudate, Pharyngeal 

Tenderness, Pharyngeal Erythema, Tympanic Membrane Red, Tympanic Membrane 

Bulging


Notes: 





Clear rhinorrhea





- NECK


Neck: Normal Inspection, Supple.  negative: Lymphadenopathy-Left, L

ymphadenopathy-Right





- RESPIRATORY


Respiratory: No Respiratory Distress, Chest Non-Tender, Wheezing - Noted with 

cough only





- CARDIOVASCULAR


Cardiovascular: Regular Rate, Regular Rhythm, No Murmur





- BACK


Back: Normal Inspection





- MUSCULOSKELETAL/EXTREMETIES


Musculoskeletal/Extremeties: MAEW, FROM





- NEURO


Level of Consciousness: Awake, Alert, Appropriate


Motor/Sensory: No Motor Deficit





- DERM


Integumentary: Warm, Dry





Course





- Re-evaluation


Re-evalutation: 





09/30/19 


Respirations even unlabored, patient nontoxic in appearance.  X-ray reviewed, no

concern for pneumonia.  Patient with wheezing only noted with coughing at this 

time.  Good return precautions discussed with patient.





- Vital Signs


Vital signs: 


                                        











Temp Pulse Resp BP Pulse Ox


 


 98.9 F   88   16   126/80 H  98 


 


 09/30/19 17:23  09/30/19 17:23  09/30/19 17:23  09/30/19 17:23  09/30/19 17:23














- Diagnostic Test


Radiology reviewed: Reports reviewed





Discharge





- Discharge


Clinical Impression: 


 Wheezing





Upper respiratory infection


Qualifiers:


 URI type: unspecified URI Qualified Code(s): J06.9 - Acute upper respiratory 

infection, unspecified





Condition: Stable


Disposition: HOME, SELF-CARE


Additional Instructions: 


Return immediately for any new or worsening symptoms





Followup with your primary care provider, call tomorrow to make a followup 

appointment





Take Coricidin HBP over-the-counter as directed to help with congestion symptoms





UPPER RESPIRATORY ILLNESS:





     You have a viral infection of the respiratory passages -- a "cold."  This 

common infection causes nasal congestion, drainage, and often sore throat and 

cough.  It is highly contagious.  The disease usually lasts about 10 to 14 days.


     There is no "cure" for the viral infection -- it must run its course.  If 

there is a complication, such as bacterial infection in the nose, sinuses, 

middle ear, or bronchial tubes, antibiotics may be required.  The antibiotics 

won't affect the virus.


     Drink plenty of fluids.  A humidifier may help.  An expectorant medication 

or decongestant may make you more comfortable.  Use acetaminophen or ibuprofen 

for fever or aches.


     See the doctor if fever persists over two days, if there is any significant

worsening of your symptoms, or if you simply fail to improve as expected.








BRONCHOSPASM:





     You have tightness in the bronchial tubes, called bronchospasm. This often 

occurs with bronchial infections.  Allergies, inhaled chemicals, and polluted or

cold air can also provoke bronchospasm. It's more likely in patients with asthma

in the family.


     Emergency treatment of bronchospasm may include adrenaline shots or 

bronchodilator aerosol.  You may feel lightheaded and have a rapid pulse for an 

hour or two.  Rest and get plenty of fluids.


     At home, we'll treat you with a bronchodilator inhaler. Antibiotics and 

corticosteroids may be required for some patients. Until you recover, avoid 

chemical fumes, dusts, pollens, and exercising in very cold or dry air. If you 

smoke, stop now!!


     If you develop a fever, increased wheezing, chest pain, or severe shortness

of breath, you should contact the doctor immediately.








INHALED BRONCHODILATORS:


     You have received a treatment of and/or prescription for an inhaled 

bronchodilator -- a medication which stimulates the airways in the lung to 

dilate.  This improves the flow of air in asthma, bronchitis, and emphysema.


     These medicines have some similarity to adrenaline, and can cause similar 

side effects:  shakiness, racing heart, and a sense of nervousness.  These side 

effects decrease with time.  Contact your doctor if these side effects are 

severe.


     Do not over-use the medicine.  Too-frequent use of the inhaler may make it 

ineffective.  Call your doctor if the inhaler is not controlling your symptoms 

at the prescribed doses.








STEROID MEDICATION:


     You have been given an injection of or oral medicine of the 

cortisone/steroid class.  This medication is used to control inflammation or 

allergy.  Que t is usually only given for a short period of time, until the 

acute process subsides.


     There are usually no side effects from short-term use of cortisone-like 

medications.  Some persons feel an increased sense of well-being and are not 

sleepy at bedtime.  Long-term use of cortisone medications is best avoided, 

unless required for a severe condition.  If your condition does not remit, or 

relapses after the course of corticosteroid medication, you should consult your 

physician.








USE OF ACETAMINOPHEN (Tylenol):


     Acetaminophen may be taken for pain relief or fever control. It's much 

safer than aspirin, offering a wider range of "safe" dosages.  It is safe during

pregnancy.  Some brand names are Tylenol, Panadol, Datril, Anacin 3, Tempra, and

Liquiprin. Acetaminophen can be repeated every four hours.  The following are 

maximum recommended dosages:


>89 pounds or adults          650 mg to 900 mg


Acetaminophen can be repeated every four hours.  Maximum dose not to exceed 4000

mg a day.








SMOKING:


     If you smoke, you should stop smoking.  The tar and chemicals in cigarette 

smoke are harmful.  Smoking has been shown to cause:


          emphysema


          chronic bronchitis


          lung cancer


          mouth and throat cancer


          stomach and pancreas cancer


          premature aging


          birth defects


     In addition, smoking increases ear and lung infections in children of 

smokers.








FOLLOW-UP CARE:


If you have been referred to a physician for follow-up care, call the 

physicians office for an appointment as you were instructed or within the next 

two days.  If you experience worsening or a significant change in your symptoms,

notify the physician immediately or return to the Emergency Department at any 

time for re-evaluation.





Prescriptions: 


Prednisone [Deltasone 20 mg Tablet] 3 tab PO DAILY 4 Days  tablet


Forms:  Smoking Cessation Education, Return to Work


Referrals: 


[facility] [Primary Care Provider] - Follow up as needed

## 2019-09-30 NOTE — RADIOLOGY REPORT (SQ)
EXAM DESCRIPTION:  CHEST 2 VIEWS



COMPLETED DATE/TIME:  9/30/2019 6:43 pm



REASON FOR STUDY:  cough



COMPARISON:  5/26/2015



EXAM PARAMETERS:  NUMBER OF VIEWS: two views

TECHNIQUE: Digital Frontal and Lateral radiographic views of the chest acquired.

RADIATION DOSE: NA

LIMITATIONS: none



FINDINGS:  LUNGS AND PLEURA: No opacities, masses or pneumothorax. No pleural effusion.

MEDIASTINUM AND HILAR STRUCTURES: No masses or contour abnormalities.

HEART AND VASCULAR STRUCTURES: Heart normal size.  No evidence for failure.

BONES: No acute findings.

HARDWARE: None in the chest.

OTHER: No other significant finding.



IMPRESSION:  NO ACUTE RADIOGRAPHIC FINDING IN THE CHEST.



TECHNICAL DOCUMENTATION:  JOB ID:  [id number]

 2011 [employer]- All Rights Reserved



Reading location - IP/workstation name: [email or web address]